# Patient Record
Sex: FEMALE | Race: WHITE | NOT HISPANIC OR LATINO | URBAN - METROPOLITAN AREA
[De-identification: names, ages, dates, MRNs, and addresses within clinical notes are randomized per-mention and may not be internally consistent; named-entity substitution may affect disease eponyms.]

---

## 2024-10-07 ENCOUNTER — TELEPHONE ENCOUNTER (OUTPATIENT)
Dept: URBAN - METROPOLITAN AREA CLINIC 72 | Facility: CLINIC | Age: 74
End: 2024-10-07

## 2024-10-14 PROBLEM — 109978004: Status: ACTIVE | Noted: 2024-10-14

## 2024-10-14 PROBLEM — 87522002: Status: ACTIVE | Noted: 2024-10-14

## 2024-10-15 ENCOUNTER — DASHBOARD ENCOUNTERS (OUTPATIENT)
Age: 74
End: 2024-10-15

## 2024-10-15 ENCOUNTER — OFFICE VISIT (OUTPATIENT)
Dept: URBAN - METROPOLITAN AREA CLINIC 72 | Facility: CLINIC | Age: 74
End: 2024-10-15
Payer: MEDICARE

## 2024-10-15 ENCOUNTER — LAB OUTSIDE AN ENCOUNTER (OUTPATIENT)
Dept: URBAN - METROPOLITAN AREA CLINIC 72 | Facility: CLINIC | Age: 74
End: 2024-10-15

## 2024-10-15 VITALS
DIASTOLIC BLOOD PRESSURE: 76 MMHG | HEIGHT: 71 IN | TEMPERATURE: 97.5 F | WEIGHT: 203.6 LBS | SYSTOLIC BLOOD PRESSURE: 150 MMHG | HEART RATE: 68 BPM | BODY MASS INDEX: 28.5 KG/M2

## 2024-10-15 DIAGNOSIS — R19.5 MUCUS IN STOOL: ICD-10-CM

## 2024-10-15 DIAGNOSIS — C85.90 T-CELL LYMPHOMA: ICD-10-CM

## 2024-10-15 DIAGNOSIS — R13.19 ESOPHAGEAL DYSPHAGIA: ICD-10-CM

## 2024-10-15 DIAGNOSIS — Z12.11 SCREENING FOR MALIGNANT NEOPLASM OF COLON: ICD-10-CM

## 2024-10-15 DIAGNOSIS — R11.0 NAUSEA: ICD-10-CM

## 2024-10-15 DIAGNOSIS — K59.09 CHRONIC CONSTIPATION: ICD-10-CM

## 2024-10-15 PROBLEM — 305058001: Status: ACTIVE | Noted: 2024-10-15

## 2024-10-15 PROBLEM — 236069009: Status: ACTIVE | Noted: 2024-10-15

## 2024-10-15 PROBLEM — 422587007: Status: ACTIVE | Noted: 2024-10-15

## 2024-10-15 PROBLEM — 271864008: Status: ACTIVE | Noted: 2024-10-15

## 2024-10-15 PROCEDURE — 99204 OFFICE O/P NEW MOD 45 MIN: CPT

## 2024-10-15 RX ORDER — DIPHENOXYLATE HYDROCHLORIDE AND ATROPINE SULFATE 2.5; .025 MG/1; MG/1
TABLET ORAL
Qty: 30 TABLET | Status: ACTIVE | COMMUNITY

## 2024-10-15 RX ORDER — FEXOFENADINE HCL 180 MG/1
TABLET ORAL
Qty: 90 TABLET | Status: ACTIVE | COMMUNITY

## 2024-10-15 RX ORDER — ALLOPURINOL 100 MG/1
TABLET ORAL
Qty: 90 TABLET | Status: ACTIVE | COMMUNITY

## 2024-10-15 RX ORDER — HYDROCORTISONE ACETATE 25 MG/1
SUPPOSITORY RECTAL
Qty: 30 SUPPOSITORY | Status: ACTIVE | COMMUNITY

## 2024-10-15 RX ORDER — PANTOPRAZOLE SODIUM 40 MG/1
TABLET, DELAYED RELEASE ORAL
Qty: 90 TABLET | Status: ACTIVE | COMMUNITY

## 2024-10-15 RX ORDER — ATORVASTATIN CALCIUM 20 MG/1
TABLET, FILM COATED ORAL
Qty: 90 TABLET | Status: ACTIVE | COMMUNITY

## 2024-10-15 RX ORDER — METRONIDAZOLE 500 MG/1
TAKE ONE TABLET BY MOUTH TWICE A DAY TABLET, FILM COATED ORAL
Qty: 14 UNSPECIFIED | Refills: 0 | Status: ACTIVE | COMMUNITY

## 2024-10-15 RX ORDER — LISINOPRIL 40 MG/1
TABLET ORAL
Qty: 90 TABLET | Status: ACTIVE | COMMUNITY

## 2024-10-15 RX ORDER — ERGOCALCIFEROL 1.25 MG/1
TAKE ONE CAPSULE BY MOUTH THREE TIMES A WEEK AS DIRECTED FOR 56 DAYS CAPSULE, LIQUID FILLED ORAL
Qty: 24 UNSPECIFIED | Refills: 0 | Status: ACTIVE | COMMUNITY

## 2024-10-15 RX ORDER — ONDANSETRON 4 MG/1
TABLET, FILM COATED ORAL
Qty: 60 TABLET | Status: ACTIVE | COMMUNITY

## 2024-10-15 RX ORDER — FAMOTIDINE 20 MG/1
TABLET ORAL
Qty: 60 TABLET | Status: ACTIVE | COMMUNITY

## 2024-10-15 RX ORDER — LISINOPRIL 40 MG/1
TABLET ORAL
Qty: 90 TABLET | Status: ON HOLD | COMMUNITY

## 2024-10-15 RX ORDER — FLECAINIDE ACETATE 100 MG/1
0.5 TABLET TABLET ORAL
Status: ACTIVE | COMMUNITY

## 2024-10-15 RX ORDER — METOPROLOL SUCCINATE 50 MG/1
TABLET, EXTENDED RELEASE ORAL
Qty: 90 TABLET | Status: ACTIVE | COMMUNITY

## 2024-10-15 RX ORDER — FEXOFENADINE HYDROCHLORIDE 60 MG/1
1 TABLET TABLET, FILM COATED ORAL TWICE A DAY
Status: ACTIVE | COMMUNITY

## 2024-10-15 NOTE — HPI-TODAY'S VISIT:
Patient is a 73-year-old female referred by Dr. Jame Umana for colon and EGD.  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  Patient was never treated for the T-cell lymphoma as recommended by multiple tertiary care clinics.  Until recently, patient was not having any symptoms.  But has noticed increased night sweats and a dry cough with increased fatigue and decreased appetite.  Has been having 4-6 loose stools daily.  Patient was seen at [facility] on September 17, 2024 and CT scan done there shows extensive adenopathy throughout the abdomen including large mesenteric nodes and diffuse retroperitoneal lymphadenopathy.  This was compared to a PET scan done 3 months earlier and actually showed decrease in size of an activity.  She was given famotidine, pantoprazole, and ondansetron upon discharge.   Dr. Umana mentioned in the referral that she had iron deficiency anemia and he was going to draw a iron panel.  I do not have those results.  Hemoglobin normal at 12.7.   Patient is seen today with her  and daughter. Patient is having 4-6 loose stools daily - a little bit of mucus, but no blood. She wakes up nauseous. She has no appetite. Has alot of gas/bloating.  Had gastric sleeve 5 years ago. Will sometimes feel like food gets hitched in throat.   Never had EGD. Lsst colonoscopy was over 20 years ago in [place]. She did not have any polyps at the last colonscopy, but there may have been a benign polyp prior to that.  No FH colon/GI cancer.   Labs: 9/23/2024-WBC 3.84, RBC 4.9, hemoglobin 12.7, hematocrit 40, MCV 81, platelets 181, , K4.1, BUN 11, creatinine 0.87, total bili 0.5, ALT 29, AST 25, ,

## 2024-10-28 ENCOUNTER — OFFICE VISIT (OUTPATIENT)
Dept: URBAN - METROPOLITAN AREA MEDICAL CENTER 40 | Facility: MEDICAL CENTER | Age: 74
End: 2024-10-28

## 2024-10-28 ENCOUNTER — TELEPHONE ENCOUNTER (OUTPATIENT)
Dept: URBAN - METROPOLITAN AREA CLINIC 72 | Facility: CLINIC | Age: 74
End: 2024-10-28

## 2024-11-22 ENCOUNTER — OFFICE VISIT (OUTPATIENT)
Dept: URBAN - METROPOLITAN AREA CLINIC 72 | Facility: CLINIC | Age: 74
End: 2024-11-22
Payer: MEDICARE

## 2024-11-22 VITALS
DIASTOLIC BLOOD PRESSURE: 76 MMHG | TEMPERATURE: 98.4 F | HEIGHT: 71 IN | SYSTOLIC BLOOD PRESSURE: 138 MMHG | WEIGHT: 216 LBS | BODY MASS INDEX: 30.24 KG/M2 | HEART RATE: 65 BPM

## 2024-11-22 DIAGNOSIS — K59.09 CHRONIC CONSTIPATION: ICD-10-CM

## 2024-11-22 DIAGNOSIS — R19.5 MUCUS IN STOOL: ICD-10-CM

## 2024-11-22 DIAGNOSIS — R11.0 NAUSEA: ICD-10-CM

## 2024-11-22 PROCEDURE — 99214 OFFICE O/P EST MOD 30 MIN: CPT

## 2024-11-22 RX ORDER — ERGOCALCIFEROL 1.25 MG/1
TAKE ONE CAPSULE BY MOUTH THREE TIMES A WEEK AS DIRECTED FOR 56 DAYS CAPSULE, LIQUID FILLED ORAL
Qty: 24 UNSPECIFIED | Refills: 0 | Status: ACTIVE | COMMUNITY

## 2024-11-22 RX ORDER — ONDANSETRON 4 MG/1
TABLET, FILM COATED ORAL
Qty: 60 TABLET | Status: ACTIVE | COMMUNITY

## 2024-11-22 RX ORDER — FAMOTIDINE 20 MG/1
TABLET ORAL
Qty: 60 TABLET | Status: ACTIVE | COMMUNITY

## 2024-11-22 RX ORDER — FLECAINIDE ACETATE 100 MG/1
0.5 TABLET TABLET ORAL
Status: ACTIVE | COMMUNITY

## 2024-11-22 RX ORDER — FEXOFENADINE HYDROCHLORIDE 60 MG/1
1 TABLET TABLET, FILM COATED ORAL TWICE A DAY
Status: ACTIVE | COMMUNITY

## 2024-11-22 RX ORDER — LISINOPRIL 40 MG/1
TABLET ORAL
Qty: 90 TABLET | Status: ON HOLD | COMMUNITY

## 2024-11-22 RX ORDER — HYDROCORTISONE ACETATE 25 MG/1
SUPPOSITORY RECTAL
Qty: 30 SUPPOSITORY | Status: ACTIVE | COMMUNITY

## 2024-11-22 RX ORDER — METOPROLOL SUCCINATE 50 MG/1
TABLET, EXTENDED RELEASE ORAL
Qty: 90 TABLET | Status: ACTIVE | COMMUNITY

## 2024-11-22 RX ORDER — METRONIDAZOLE 500 MG/1
TAKE ONE TABLET BY MOUTH TWICE A DAY TABLET, FILM COATED ORAL
Qty: 14 UNSPECIFIED | Refills: 0 | Status: ACTIVE | COMMUNITY

## 2024-11-22 RX ORDER — DIPHENOXYLATE HYDROCHLORIDE AND ATROPINE SULFATE 2.5; .025 MG/1; MG/1
TABLET ORAL
Qty: 30 TABLET | Status: ACTIVE | COMMUNITY

## 2024-11-22 RX ORDER — ALLOPURINOL 100 MG/1
TABLET ORAL
Qty: 90 TABLET | Status: ACTIVE | COMMUNITY

## 2024-11-22 RX ORDER — PANTOPRAZOLE SODIUM 40 MG/1
TABLET, DELAYED RELEASE ORAL
Qty: 90 TABLET | Status: ACTIVE | COMMUNITY

## 2024-11-22 RX ORDER — LISINOPRIL 40 MG/1
TABLET ORAL
Qty: 90 TABLET | Status: ACTIVE | COMMUNITY

## 2024-11-22 RX ORDER — ATORVASTATIN CALCIUM 20 MG/1
TABLET, FILM COATED ORAL
Qty: 90 TABLET | Status: ACTIVE | COMMUNITY

## 2024-11-22 RX ORDER — FEXOFENADINE HCL 180 MG/1
TABLET ORAL
Qty: 90 TABLET | Status: ACTIVE | COMMUNITY

## 2024-11-22 NOTE — HPI-TODAY'S VISIT:
Patient is a 74-year-old female last seen in the office on 10/15/2024 for chronic constipation, nausea, mucus in stool, and dysphagia.  Patient was scheduled for EGD and colonoscopy because of possible iron deficiency anemia.  Patient was asked to start fiber and MiraLAX daily.  Has lymph node bx on Tuesday. Had pleural fluid drained today.   Started fiber gummies daily - taking 1 daily and her bowels are moving daily. 2-3 BM's daily. They are soft and formed. However, with the procedures she has had the last 2 days she feels she is on the constipated side. She will get back on [facility] fiber tonight. All assoc sx have resolved. No more abd pain, gas/bloat has gotten better. No more mucus in the stool.

## 2024-11-22 NOTE — HPI-OTHER HISTORIES
Labs: 9/23/2024-WBC 3.84, RBC 4.9, hemoglobin 12.7, hematocrit 40, MCV 81, platelets 181, , K4.1, BUN 11, creatinine 0.87, total bili 0.5, ALT 29, AST 25, ,   Procedures: EGD-10/28/2024: Duodenum normal. Gastric sleeve anatomy with gastritis. Esophagus normal. Z-line was noted to be at 40 cm from the incisors. Empirically dilated to 18 mm with guidewire dilator. Path: Duodenal mucosa with no diagnostic abnormality. Esophageal biopsy shows mild chronic inflammation.  Colonoscopy-10/28/2024: Perianal exam revealed large nonbleeding prolapsing hemorrhoids and external ulceration. Terminal ileum normal. Pancolonic diverticulosis. 5 mm sessile descending colon polyp. Random colon polyp biopsies done. Rectal anal polyp. Path: Random colon biopsies negative. Descending colon polyp hyperplastic. Rectal polyp hypertrophied papula.

## 2025-02-24 ENCOUNTER — OFFICE VISIT (OUTPATIENT)
Dept: URBAN - METROPOLITAN AREA CLINIC 72 | Facility: CLINIC | Age: 75
End: 2025-02-24
Payer: MEDICARE

## 2025-02-24 VITALS — DIASTOLIC BLOOD PRESSURE: 75 MMHG | HEIGHT: 71 IN | HEART RATE: 56 BPM | SYSTOLIC BLOOD PRESSURE: 117 MMHG

## 2025-02-24 DIAGNOSIS — K59.09 CHRONIC CONSTIPATION: ICD-10-CM

## 2025-02-24 DIAGNOSIS — R19.7 OVERFLOW DIARRHEA: ICD-10-CM

## 2025-02-24 PROBLEM — 162106006: Status: ACTIVE | Noted: 2025-02-24

## 2025-02-24 PROCEDURE — 99213 OFFICE O/P EST LOW 20 MIN: CPT

## 2025-02-24 PROCEDURE — 99214 OFFICE O/P EST MOD 30 MIN: CPT

## 2025-02-24 RX ORDER — ATORVASTATIN CALCIUM 20 MG/1
TABLET, FILM COATED ORAL
Qty: 90 TABLET | Status: ACTIVE | COMMUNITY

## 2025-02-24 RX ORDER — HYDROCORTISONE ACETATE 25 MG/1
SUPPOSITORY RECTAL
Qty: 30 SUPPOSITORY | Status: ACTIVE | COMMUNITY

## 2025-02-24 RX ORDER — METRONIDAZOLE 500 MG/1
TAKE ONE TABLET BY MOUTH TWICE A DAY TABLET, FILM COATED ORAL
Qty: 14 UNSPECIFIED | Refills: 0 | Status: ACTIVE | COMMUNITY

## 2025-02-24 RX ORDER — DIPHENOXYLATE HYDROCHLORIDE AND ATROPINE SULFATE 2.5; .025 MG/1; MG/1
TABLET ORAL
Qty: 30 TABLET | Status: ACTIVE | COMMUNITY

## 2025-02-24 RX ORDER — FLECAINIDE ACETATE 100 MG/1
0.5 TABLET TABLET ORAL
Status: ACTIVE | COMMUNITY

## 2025-02-24 RX ORDER — METOPROLOL SUCCINATE 50 MG/1
TABLET, EXTENDED RELEASE ORAL
Qty: 90 TABLET | Status: ACTIVE | COMMUNITY

## 2025-02-24 RX ORDER — ONDANSETRON 4 MG/1
TABLET, FILM COATED ORAL
Qty: 60 TABLET | Status: ACTIVE | COMMUNITY

## 2025-02-24 RX ORDER — FAMOTIDINE 20 MG/1
TABLET ORAL
Qty: 60 TABLET | Status: ACTIVE | COMMUNITY

## 2025-02-24 RX ORDER — LISINOPRIL 40 MG/1
TABLET ORAL
Qty: 90 TABLET | Status: ACTIVE | COMMUNITY

## 2025-02-24 RX ORDER — ALLOPURINOL 100 MG/1
TABLET ORAL
Qty: 90 TABLET | Status: ACTIVE | COMMUNITY

## 2025-02-24 RX ORDER — PREDNISONE 20 MG/1
5 TABLETS TABLET ORAL ONCE A DAY
Status: ACTIVE | COMMUNITY

## 2025-02-24 RX ORDER — ERGOCALCIFEROL 1.25 MG/1
TAKE ONE CAPSULE BY MOUTH THREE TIMES A WEEK AS DIRECTED FOR 56 DAYS CAPSULE, LIQUID FILLED ORAL
Qty: 24 UNSPECIFIED | Refills: 0 | Status: ON HOLD | COMMUNITY

## 2025-02-24 RX ORDER — PANTOPRAZOLE SODIUM 40 MG/1
TABLET, DELAYED RELEASE ORAL
Qty: 90 TABLET | Status: ACTIVE | COMMUNITY

## 2025-02-24 RX ORDER — FEXOFENADINE HYDROCHLORIDE 180 MG/1
TABLET ORAL
Qty: 90 TABLET | Status: ACTIVE | COMMUNITY

## 2025-02-24 RX ORDER — FEXOFENADINE HYDROCHLORIDE 60 MG/1
1 TABLET TABLET, FILM COATED ORAL TWICE A DAY
Status: ON HOLD | COMMUNITY

## 2025-02-24 RX ORDER — LISINOPRIL 40 MG/1
TABLET ORAL
Qty: 90 TABLET | Status: ON HOLD | COMMUNITY

## 2025-02-24 NOTE — HPI-TODAY'S VISIT:
Patient is a 74-year-old female last seen in the office on 11/22/2024 for chronic constipation, nausea, mucus in stool and dysphagia.  Patient started on 1 fiber gummy daily and bowels are moving.  She is here for 3-month follow-up.  Patient has started chemo treatment for T-cell Lyphoma. This was started on Jan 27 th. Chemo is every 3 weeks x 6 treatments. SHe is coming up on her 3rd treatment.   Bowels have changed. She had an episode of diarrhea the ther night that was incontinent. Before that the constipation was horrific. Her hemorrhoids are "screaming". She has only stuck with her Benfiber gummies (x2).

## 2025-03-24 ENCOUNTER — TELEPHONE ENCOUNTER (OUTPATIENT)
Dept: URBAN - METROPOLITAN AREA CLINIC 72 | Facility: CLINIC | Age: 75
End: 2025-03-24

## 2025-03-24 RX ORDER — HYDROCORTISONE ACETATE 25 MG/1
1 SUPPOSITORY SUPPOSITORY RECTAL TWICE A DAY
Qty: 28 | Refills: 2 | OUTPATIENT
Start: 2025-03-24 | End: 2025-05-05

## 2025-03-24 RX ORDER — HYDROCORTISONE 25 MG/G
1 APPLICATION CREAM TOPICAL TWICE A DAY
Qty: 1 | Refills: 2 | OUTPATIENT
Start: 2025-03-24 | End: 2025-05-05

## 2025-04-22 ENCOUNTER — OFFICE VISIT (OUTPATIENT)
Dept: URBAN - METROPOLITAN AREA CLINIC 72 | Facility: CLINIC | Age: 75
End: 2025-04-22
Payer: MEDICARE

## 2025-04-22 DIAGNOSIS — K64.2 PROLAPSED INTERNAL HEMORRHOIDS, GRADE 3: ICD-10-CM

## 2025-04-22 DIAGNOSIS — C85.90 T-CELL LYMPHOMA: ICD-10-CM

## 2025-04-22 DIAGNOSIS — K59.09 CHRONIC CONSTIPATION: ICD-10-CM

## 2025-04-22 PROBLEM — 721705006: Status: ACTIVE | Noted: 2025-04-22

## 2025-04-22 PROCEDURE — 99213 OFFICE O/P EST LOW 20 MIN: CPT | Performed by: INTERNAL MEDICINE

## 2025-04-22 RX ORDER — VALACYCLOVIR HYDROCHLORIDE 500 MG/1
1 TABLET TABLET, FILM COATED ORAL TWICE A DAY
Status: ACTIVE | COMMUNITY

## 2025-04-22 RX ORDER — DIPHENOXYLATE HYDROCHLORIDE AND ATROPINE SULFATE 2.5; .025 MG/1; MG/1
TABLET ORAL
Qty: 30 TABLET | Status: ACTIVE | COMMUNITY

## 2025-04-22 RX ORDER — FEXOFENADINE HYDROCHLORIDE 180 MG/1
TABLET ORAL
Qty: 90 TABLET | Status: ACTIVE | COMMUNITY

## 2025-04-22 RX ORDER — PREDNISONE 20 MG/1
5 TABLETS TABLET ORAL ONCE A DAY
Status: ACTIVE | COMMUNITY

## 2025-04-22 RX ORDER — HYDROCORTISONE ACETATE 25 MG/1
SUPPOSITORY RECTAL
Qty: 30 SUPPOSITORY | Status: ACTIVE | COMMUNITY

## 2025-04-22 RX ORDER — LISINOPRIL 40 MG/1
TABLET ORAL
Qty: 90 TABLET | Status: ON HOLD | COMMUNITY

## 2025-04-22 RX ORDER — PANTOPRAZOLE SODIUM 40 MG/1
TABLET, DELAYED RELEASE ORAL
Qty: 90 TABLET | Status: ACTIVE | COMMUNITY

## 2025-04-22 RX ORDER — HYDROCORTISONE ACETATE 25 MG/1
1 SUPPOSITORY SUPPOSITORY RECTAL TWICE A DAY
Qty: 28 | Refills: 2 | Status: ACTIVE | COMMUNITY
Start: 2025-03-24 | End: 2025-05-05

## 2025-04-22 RX ORDER — ALLOPURINOL 100 MG/1
TABLET ORAL
Qty: 90 TABLET | Status: ACTIVE | COMMUNITY

## 2025-04-22 RX ORDER — METOPROLOL SUCCINATE 50 MG/1
TABLET, EXTENDED RELEASE ORAL
Qty: 90 TABLET | Status: ACTIVE | COMMUNITY

## 2025-04-22 RX ORDER — FEXOFENADINE HYDROCHLORIDE 60 MG/1
1 TABLET TABLET, FILM COATED ORAL TWICE A DAY
Status: ON HOLD | COMMUNITY

## 2025-04-22 RX ORDER — HYDROCORTISONE 25 MG/G
1 APPLICATION CREAM TOPICAL TWICE A DAY
Qty: 1 | Refills: 2 | Status: ACTIVE | COMMUNITY
Start: 2025-03-24 | End: 2025-05-05

## 2025-04-22 RX ORDER — FLECAINIDE ACETATE 100 MG/1
0.5 TABLET TABLET ORAL
Status: ACTIVE | COMMUNITY

## 2025-04-22 RX ORDER — ERGOCALCIFEROL 1.25 MG/1
TAKE ONE CAPSULE BY MOUTH THREE TIMES A WEEK AS DIRECTED FOR 56 DAYS CAPSULE, LIQUID FILLED ORAL
Qty: 24 UNSPECIFIED | Refills: 0 | Status: ON HOLD | COMMUNITY

## 2025-04-22 RX ORDER — METRONIDAZOLE 500 MG/1
TAKE ONE TABLET BY MOUTH TWICE A DAY TABLET, FILM COATED ORAL
Qty: 14 UNSPECIFIED | Refills: 0 | Status: ACTIVE | COMMUNITY

## 2025-04-22 RX ORDER — ONDANSETRON 4 MG/1
TABLET, FILM COATED ORAL
Qty: 60 TABLET | Status: ACTIVE | COMMUNITY

## 2025-04-22 RX ORDER — ATORVASTATIN CALCIUM 20 MG/1
TABLET, FILM COATED ORAL
Qty: 90 TABLET | Status: ACTIVE | COMMUNITY

## 2025-04-22 RX ORDER — FAMOTIDINE 20 MG/1
TABLET ORAL
Qty: 60 TABLET | Status: ACTIVE | COMMUNITY

## 2025-04-22 RX ORDER — LISINOPRIL 40 MG/1
TABLET ORAL
Qty: 90 TABLET | Status: ACTIVE | COMMUNITY

## 2025-04-22 NOTE — HPI-TODAY'S VISIT:
Mrs. Joy returns for follow-up.  She was last seen in office on 2/24/2025.  She was referred to us for overflow diarrhea secondary to constipation, at last visit she reported episodes of incontinence with her hemorrhoids causing pain.  She was using Benefiber at that time.  It is recommended that she add MiraLAX or stimulant laxative titrated to effect and she was set up for hemorrhoid banding which this appointment was supposed to be today however patient states that is not the case.  Per telephone encounter 3/24/2025 patient called saying she was having painful hemorrhoidal issues suppositories were sent in and she was scheduled for banding due to this.  She is on chemotherapy.  She has been experiencing constipation with chemotherapy.  Her bowels are getting softer with her bowel program and the suppositories have helped her a lot.  We discussed that it probably is not a good idea for her to have hemorrhoid banding as she is actively undergoing treatment with chemotherapy

## 2025-04-22 NOTE — EXAM-PHYSICAL EXAM
General- no acute distress, resting comfortably Eyes- anicteric, no pallor HENT- normocephalic, atraumatic head Neck- no lymphadenopathy, symmetric Chest- non labored breathing, equal rise Abdomen- soft, non tender, non distended, no organomegaly Ext: JR, no obvious sores or rashes